# Patient Record
Sex: FEMALE | Race: AMERICAN INDIAN OR ALASKA NATIVE | URBAN - METROPOLITAN AREA
[De-identification: names, ages, dates, MRNs, and addresses within clinical notes are randomized per-mention and may not be internally consistent; named-entity substitution may affect disease eponyms.]

---

## 2021-08-11 ENCOUNTER — OFFICE (OUTPATIENT)
Dept: URBAN - METROPOLITAN AREA CLINIC 35 | Facility: CLINIC | Age: 56
Setting detail: OPHTHALMOLOGY
End: 2021-08-11
Payer: MEDICAID

## 2021-08-11 DIAGNOSIS — E11.9: ICD-10-CM

## 2021-08-11 DIAGNOSIS — H35.363: ICD-10-CM

## 2021-08-11 DIAGNOSIS — H25.13: ICD-10-CM

## 2021-08-11 DIAGNOSIS — H02.834: ICD-10-CM

## 2021-08-11 DIAGNOSIS — H25.013: ICD-10-CM

## 2021-08-11 DIAGNOSIS — H02.831: ICD-10-CM

## 2021-08-11 PROBLEM — H52.4 PRESBYOPIA: Status: ACTIVE | Noted: 2021-08-11

## 2021-08-11 PROCEDURE — 92004 COMPRE OPH EXAM NEW PT 1/>: CPT | Performed by: OPHTHALMOLOGY

## 2021-08-11 PROCEDURE — 92250 FUNDUS PHOTOGRAPHY W/I&R: CPT | Performed by: OPHTHALMOLOGY

## 2021-08-11 ASSESSMENT — KERATOMETRY
OS_AXISANGLE_DEGREES: 094
OD_AXISANGLE_DEGREES: 096
OD_K2POWER_DIOPTERS: 43.00
OS_K1POWER_DIOPTERS: 42.00
OS_K2POWER_DIOPTERS: 4.00
OD_K1POWER_DIOPTERS: 42.00

## 2021-08-11 ASSESSMENT — REFRACTION_AUTOREFRACTION
OD_SPHERE: +1.25
OS_SPHERE: +1.50
OS_AXIS: 161
OD_CYLINDER: -0.50
OS_CYLINDER: -0.50
OD_AXIS: 043

## 2021-08-11 ASSESSMENT — VISUAL ACUITY
OD_BCVA: 20/40+1
OS_BCVA: 20/25-3

## 2021-08-11 ASSESSMENT — TONOMETRY
OD_IOP_MMHG: 17
OS_IOP_MMHG: 17

## 2021-08-11 ASSESSMENT — SPHEQUIV_DERIVED
OD_SPHEQUIV: 1
OD_SPHEQUIV: 0.5
OS_SPHEQUIV: 0.375
OS_SPHEQUIV: 1.25
OD_SPHEQUIV: 0.5

## 2021-08-11 ASSESSMENT — REFRACTION_MANIFEST
OS_SPHERE: +2.50
OS_AXIS: 161
OD_SPHERE: +0.75
OS_SPHERE: +0.75
OS_SPHERE: +1.00
OD_SPHERE: +0.75
OS_CYLINDER: -0.75
OD_SPHERE: +2.50
OD_CYLINDER: -0.50
OD_AXIS: 045
OS_VA1: 20/25+1
OD_VA1: 20/25
OS_VA1: 20/20+1
OD_AXIS: 043
OD_VA1: 20/25-1
OD_CYLINDER: -0.50

## 2021-08-11 ASSESSMENT — LID POSITION - DERMATOCHALASIS
OS_DERMATOCHALASIS: LUL 1+
OD_DERMATOCHALASIS: RUL 1+

## 2021-08-11 ASSESSMENT — REFRACTION_CURRENTRX
OD_OVR_VA: 20/
OS_SPHERE: +2.25
OS_VPRISM_DIRECTION: SV
OD_CYLINDER: SPH
OD_SPHERE: +2.25
OD_VPRISM_DIRECTION: SV
OS_OVR_VA: 20/
OS_CYLINDER: SPH

## 2021-08-11 ASSESSMENT — AXIALLENGTH_DERIVED
OD_AL: 23.7657
OD_AL: 23.7657
OS_AL: 33.63
OD_AL: 23.5696
OS_AL: 34.3393

## 2021-08-11 ASSESSMENT — LID POSITION - COMMENTS
OD_COMMENTS: WITH HOODING
OS_COMMENTS: WITH HOODING

## 2021-08-11 ASSESSMENT — CONFRONTATIONAL VISUAL FIELD TEST (CVF)
OD_FINDINGS: FULL
OS_FINDINGS: FULL

## 2023-06-02 ENCOUNTER — EMERGENCY (EMERGENCY)
Facility: HOSPITAL | Age: 58
LOS: 0 days | Discharge: HOME | End: 2023-06-03
Attending: STUDENT IN AN ORGANIZED HEALTH CARE EDUCATION/TRAINING PROGRAM
Payer: MEDICAID

## 2023-06-02 VITALS
HEIGHT: 60 IN | WEIGHT: 164.91 LBS | RESPIRATION RATE: 18 BRPM | HEART RATE: 110 BPM | DIASTOLIC BLOOD PRESSURE: 79 MMHG | SYSTOLIC BLOOD PRESSURE: 120 MMHG | TEMPERATURE: 98 F | OXYGEN SATURATION: 98 %

## 2023-06-02 DIAGNOSIS — R19.7 DIARRHEA, UNSPECIFIED: ICD-10-CM

## 2023-06-02 DIAGNOSIS — R10.9 UNSPECIFIED ABDOMINAL PAIN: ICD-10-CM

## 2023-06-02 DIAGNOSIS — E11.9 TYPE 2 DIABETES MELLITUS WITHOUT COMPLICATIONS: ICD-10-CM

## 2023-06-02 PROCEDURE — 99284 EMERGENCY DEPT VISIT MOD MDM: CPT

## 2023-06-02 NOTE — ED ADULT TRIAGE NOTE - CHIEF COMPLAINT QUOTE
pt accompanied by daugther complaining of abd pain and n/v/d since this afternoon after having cold milk.  hx of DM, thyroid disorder.  nkda.

## 2023-06-03 VITALS
OXYGEN SATURATION: 97 % | RESPIRATION RATE: 18 BRPM | DIASTOLIC BLOOD PRESSURE: 71 MMHG | SYSTOLIC BLOOD PRESSURE: 100 MMHG | TEMPERATURE: 98 F | HEART RATE: 95 BPM

## 2023-06-03 RX ORDER — SIMETHICONE 80 MG/1
80 TABLET, CHEWABLE ORAL ONCE
Refills: 0 | Status: DISCONTINUED | OUTPATIENT
Start: 2023-06-03 | End: 2023-06-03

## 2023-06-03 RX ORDER — FAMOTIDINE 10 MG/ML
20 INJECTION INTRAVENOUS ONCE
Refills: 0 | Status: DISCONTINUED | OUTPATIENT
Start: 2023-06-03 | End: 2023-06-03

## 2023-06-03 NOTE — ED PROVIDER NOTE - OBJECTIVE STATEMENT
59 y/o F w/ hx of DM presenting to the ED c/o abd pain. Per daughter, patient's brother  today and afterwards she began having abdominal pain and diarrhea. She reports feeling "gas" pain earlier that has now resolved. Currently she has no symptoms. No fever, chills, N/V.

## 2023-06-03 NOTE — ED ADULT NURSE NOTE - GASTROINTESTINAL ASSESSMENT
[Alert] : alert [Well Nourished] : well nourished [No Acute Distress] : no acute distress [Well Developed] : well developed [Normal Sclera/Conjunctiva] : normal sclera/conjunctiva [No Proptosis] : no proptosis [No LAD] : no lymphadenopathy [Thyroid Not Enlarged] : the thyroid was not enlarged [No Thyroid Nodules] : no palpable thyroid nodules [No Respiratory Distress] : no respiratory distress [No Accessory Muscle Use] : no accessory muscle use [Normal Rate and Effort] : normal respiratory rate and effort [Clear to Auscultation] : lungs were clear to auscultation bilaterally [Normal S1, S2] : normal S1 and S2 [Normal Rate] : heart rate was normal [Regular Rhythm] : with a regular rhythm [No Edema] : no peripheral edema [Normal Bowel Sounds] : normal bowel sounds [Not Tender] : non-tender [Soft] : abdomen soft [Normal Gait] : normal gait [No Rash] : no rash [No Tremors] : no tremors [Oriented x3] : oriented to person, place, and time [Normal Affect] : the affect was normal [Normal Insight/Judgement] : insight and judgment were intact [Normal Mood] : the mood was normal [Acanthosis Nigricans] : no acanthosis nigricans - - -

## 2023-06-03 NOTE — ED PROVIDER NOTE - CLINICAL SUMMARY MEDICAL DECISION MAKING FREE TEXT BOX
59 y/o F with PMH DM presenting to the ED with abdominal pain.  Vitals stable.  Patient is well appearing in NAD.  She has no acute abd tenderness.  States she feels "gas".  Requesting discharge home.

## 2023-06-03 NOTE — ED PROVIDER NOTE - PATIENT PORTAL LINK FT
You can access the FollowMyHealth Patient Portal offered by [facility] by registering at the following website: [email or web address]. By joining [email or web address]’s FollowMyHealth portal, you will also be able to view your health information using other applications (apps) compatible with our system.

## 2023-06-03 NOTE — ED ADULT NURSE NOTE - CHPI ED NUR SYMPTOMS NEG
no pain/no abdominal distension/no blood in stool/no burning urination/no chills/no diarrhea/no fever/no hematuria/no nausea/no vomiting

## 2023-06-03 NOTE — ED PROVIDER NOTE - NS ED ROS FT
CONST: no fevers, no chills  EYES: no pain, no vision changes  ENT: no sore throat, no ear pain, no change in hearing  CV: no chest pain, no leg swelling  RESP: no shortness of breath, no cough  ABD:  +abdominal pain, no nausea, no vomiting, + diarrhea  : no dysuria, no flank pain, no hematuria  MSK: no back pain, no extremity pain  NEURO: no headache or additional neurologic complaints  HEME: no easy bleeding  SKIN:  no rash

## 2023-06-03 NOTE — ED ADULT NURSE NOTE - OBJECTIVE STATEMENT
Patient A&Ox4 presents to ED c/o anxiety induced abdominal pain and N/V that began this afternoon after receiving news that her brother . Patient denies pain, n/V, f/c, CP, SOB, at this time.

## 2023-06-03 NOTE — ED ADULT NURSE NOTE - NSFALLUNIVINTERV_ED_ALL_ED
Bed/Stretcher in lowest position, wheels locked, appropriate side rails in place/Call bell, personal items and telephone in reach/Instruct patient to call for assistance before getting out of bed/chair/stretcher/Non-slip footwear applied when patient is off stretcher/[employer] to call system/Physically safe environment - no spills, clutter or unnecessary equipment/Purposeful proactive rounding/Room/bathroom lighting operational, light cord in reach